# Patient Record
Sex: MALE | Race: WHITE | NOT HISPANIC OR LATINO | URBAN - METROPOLITAN AREA
[De-identification: names, ages, dates, MRNs, and addresses within clinical notes are randomized per-mention and may not be internally consistent; named-entity substitution may affect disease eponyms.]

---

## 2024-11-26 ENCOUNTER — APPOINTMENT (OUTPATIENT)
Dept: OPHTHALMOLOGY | Facility: CLINIC | Age: 69
End: 2024-11-26

## 2025-01-30 ENCOUNTER — APPOINTMENT (OUTPATIENT)
Dept: OPHTHALMOLOGY | Age: 70
End: 2025-01-30

## 2025-01-30 DIAGNOSIS — H52.03 HYPEROPIA OF BOTH EYES WITH ASTIGMATISM AND PRESBYOPIA: Primary | ICD-10-CM

## 2025-01-30 DIAGNOSIS — Z97.3 WEARS CONTACT LENSES: ICD-10-CM

## 2025-01-30 DIAGNOSIS — H02.053 TRICHIASIS OF EYELID OF BOTH EYES: ICD-10-CM

## 2025-01-30 DIAGNOSIS — H02.056 TRICHIASIS OF EYELID OF BOTH EYES: ICD-10-CM

## 2025-01-30 DIAGNOSIS — H52.4 HYPEROPIA OF BOTH EYES WITH ASTIGMATISM AND PRESBYOPIA: Primary | ICD-10-CM

## 2025-01-30 DIAGNOSIS — H52.203 HYPEROPIA OF BOTH EYES WITH ASTIGMATISM AND PRESBYOPIA: Primary | ICD-10-CM

## 2025-01-30 DIAGNOSIS — H25.13 NUCLEAR SCLEROSIS OF BOTH EYES: ICD-10-CM

## 2025-01-30 PROCEDURE — 92310 CONTACT LENS FITTING OU: CPT | Performed by: OPTOMETRIST

## 2025-01-30 PROCEDURE — 92015 DETERMINE REFRACTIVE STATE: CPT | Performed by: OPTOMETRIST

## 2025-01-30 ASSESSMENT — REFRACTION_CURRENTRX
OS_SPHERE: +11.00
OD_BASECURVE: 7.9
OS_BASECURVE: 7.9
OD_SPHERE: +10.00
OS_DIAMETER: 9.2
OD_DIAMETER: 9.2

## 2025-01-30 ASSESSMENT — VISUAL ACUITY
OS_CC+: -1
OD_PH_CC+: +1
VA_OR_OS_CURRENT_RX: 20/20
OS_CC: 20/20
METHOD: SNELLEN - LINEAR
OD_CC: 20/40
OD_CC+: +2
VA_OR_OD_CURRENT_RX: 20/30
CORRECTION_TYPE: CONTACTS
OD_PH_CC: 20/30

## 2025-01-30 ASSESSMENT — CONF VISUAL FIELD
OS_SUPERIOR_TEMPORAL_RESTRICTION: 0
OD_INFERIOR_NASAL_RESTRICTION: 0
OS_SUPERIOR_NASAL_RESTRICTION: 0
OS_INFERIOR_TEMPORAL_RESTRICTION: 0
OS_NORMAL: 1
OS_INFERIOR_NASAL_RESTRICTION: 0
OD_SUPERIOR_NASAL_RESTRICTION: 0
OD_NORMAL: 1
OD_SUPERIOR_TEMPORAL_RESTRICTION: 0
OD_INFERIOR_TEMPORAL_RESTRICTION: 0

## 2025-01-30 ASSESSMENT — REFRACTION_MANIFEST
OS_SPHERE: +9.25
OD_SPHERE: +8.00
OD_AXIS: 085
OD_CYLINDER: -0.25
OD_ADD: +2.50
METHOD_AUTOREFRACTION: 1
OS_ADD: +2.50
OD_CYLINDER: -0.25
OD_AXIS: 070
OD_SPHERE: +8.50
OS_SPHERE: +9.50
OS_CYLINDER: -0.75
OS_CYLINDER: SPHERE
OS_AXIS: 074

## 2025-01-30 ASSESSMENT — EXTERNAL EXAM - LEFT EYE: OS_EXAM: NORMAL

## 2025-01-30 ASSESSMENT — TONOMETRY
IOP_METHOD: GOLDMANN APPLANATION
OS_IOP_MMHG: 15
OD_IOP_MMHG: 14

## 2025-01-30 ASSESSMENT — CUP TO DISC RATIO
OD_RATIO: 0.0
OS_RATIO: 0.0

## 2025-01-30 ASSESSMENT — EXTERNAL EXAM - RIGHT EYE: OD_EXAM: NORMAL

## 2025-01-30 NOTE — PROGRESS NOTES
A spectacle prescription was given at the patient`s request.     A contact lens prescription was dispensed at the patient`s request. CL order placed. 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     VA has been in the 20/25-20/30 range OD and 20/20 OS. OS has always been the stronger eye. Stable.     Only refraction and CL eval fee. Can order CL PRN.     Does not have medical insurance yet. Will have patient back when he has medical insurance and will epilate trichiasis lashes and do OCT macula and DFE.

## 2025-02-27 ENCOUNTER — APPOINTMENT (OUTPATIENT)
Dept: OPHTHALMOLOGY | Age: 70
End: 2025-02-27

## 2025-08-11 ENCOUNTER — TELEPHONE (OUTPATIENT)
Dept: OPHTHALMOLOGY | Facility: CLINIC | Age: 70
End: 2025-08-11